# Patient Record
Sex: FEMALE | Race: WHITE | Employment: FULL TIME | URBAN - METROPOLITAN AREA
[De-identification: names, ages, dates, MRNs, and addresses within clinical notes are randomized per-mention and may not be internally consistent; named-entity substitution may affect disease eponyms.]

---

## 2017-04-06 ENCOUNTER — HOSPITAL ENCOUNTER (OUTPATIENT)
Dept: MAMMOGRAPHY | Age: 62
Discharge: HOME | End: 2017-04-06
Attending: NURSE PRACTITIONER
Payer: COMMERCIAL

## 2017-04-06 DIAGNOSIS — Z12.39 BREAST SCREENING: ICD-10-CM

## 2017-04-06 PROCEDURE — 77067 SCR MAMMO BI INCL CAD: CPT

## 2019-02-27 PROBLEM — Z76.0 MEDICINE REFILL: Status: ACTIVE | Noted: 2019-02-27

## 2019-03-11 PROBLEM — Z00.00 PREVENTATIVE HEALTH CARE: Status: ACTIVE | Noted: 2019-02-27

## 2019-04-26 ENCOUNTER — HOSPITAL ENCOUNTER (OUTPATIENT)
Dept: MAMMOGRAPHY | Age: 64
Discharge: HOME | End: 2019-04-26
Attending: FAMILY MEDICINE
Payer: COMMERCIAL

## 2019-04-26 DIAGNOSIS — Z12.31 VISIT FOR SCREENING MAMMOGRAM: ICD-10-CM

## 2019-04-26 PROCEDURE — 77067 SCR MAMMO BI INCL CAD: CPT

## 2019-09-23 PROBLEM — Z00.00 PREVENTATIVE HEALTH CARE: Status: RESOLVED | Noted: 2019-02-27 | Resolved: 2019-09-23

## 2021-04-23 ENCOUNTER — TRANSCRIBE ORDER (OUTPATIENT)
Dept: SCHEDULING | Age: 66
End: 2021-04-23

## 2021-04-23 DIAGNOSIS — Z12.31 SCREENING MAMMOGRAM FOR HIGH-RISK PATIENT: Primary | ICD-10-CM

## 2021-04-30 ENCOUNTER — HOSPITAL ENCOUNTER (OUTPATIENT)
Dept: MAMMOGRAPHY | Age: 66
Discharge: HOME | End: 2021-04-30
Attending: FAMILY MEDICINE
Payer: COMMERCIAL

## 2021-04-30 DIAGNOSIS — Z12.31 SCREENING MAMMOGRAM FOR HIGH-RISK PATIENT: ICD-10-CM

## 2021-04-30 PROCEDURE — 77063 BREAST TOMOSYNTHESIS BI: CPT

## 2021-11-01 ENCOUNTER — HOSPITAL ENCOUNTER (EMERGENCY)
Age: 66
Discharge: HOME | End: 2021-11-01
Attending: EMERGENCY MEDICINE
Payer: COMMERCIAL

## 2021-11-01 ENCOUNTER — APPOINTMENT (OUTPATIENT)
Dept: CT IMAGING | Age: 66
End: 2021-11-01
Attending: PHYSICIAN ASSISTANT
Payer: COMMERCIAL

## 2021-11-01 ENCOUNTER — APPOINTMENT (OUTPATIENT)
Dept: GENERAL RADIOLOGY | Age: 66
End: 2021-11-01
Attending: PHYSICIAN ASSISTANT
Payer: COMMERCIAL

## 2021-11-01 VITALS
HEART RATE: 80 BPM | RESPIRATION RATE: 17 BRPM | HEIGHT: 61 IN | OXYGEN SATURATION: 99 % | WEIGHT: 219 LBS | SYSTOLIC BLOOD PRESSURE: 168 MMHG | TEMPERATURE: 98.8 F | BODY MASS INDEX: 41.35 KG/M2 | DIASTOLIC BLOOD PRESSURE: 83 MMHG
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
## 2021-11-01 PROCEDURE — 70450 CT HEAD/BRAIN W/O DYE: CPT

## 2021-11-01 PROCEDURE — 73030 X-RAY EXAM OF SHOULDER: CPT

## 2021-11-01 PROCEDURE — 99283 EMERGENCY DEPT VISIT LOW MDM: CPT

## 2021-11-01 PROCEDURE — 72125 CT NECK SPINE W/O DYE: CPT

## 2021-11-01 NOTE — DISCHARGE INSTRUCTIONS
X-rays and CAT scan are negative for internal injury or fractures. Use Tylenol or Motrin for pain. Return for any other acute concerns.

## 2021-11-01 NOTE — ED NOTES
I have reviewed discharge instructions with the patient. The patient verbalized understanding. Patient left ED via Discharge Method: ambulatory to Home with self. Opportunity for questions and clarification provided. Patient given 0 scripts. To continue your aftercare when you leave the hospital, you may receive an automated call from our care team to check in on how you are doing. This is a free service and part of our promise to provide the best care and service to meet your aftercare needs.  If you have questions, or wish to unsubscribe from this service please call [phone]. Thank you for Choosing our [facility] Emergency Department.

## 2021-11-01 NOTE — ED PROVIDER NOTES
Patient is a 80-year-old female presents to the emergency department this morning after suffering an accidental fall at work at 5 AM.  She states she tripped on a landscape rock at the edge of the sidewalk. [place] forward onto a concrete sidewalk. She did strike her head. Complains of left shoulder pain, headache. There was no loss of consciousness. There was some left-sided neck pain but that is since resolved after taking some Tylenol. The history is provided by the patient. Fall  The accident occurred 3 to 5 hours ago. The fall occurred while walking. She fell from a height of ground level. She landed on concrete. There was no blood loss. The point of impact was the head. The pain is present in the head and left shoulder. The pain is mild. She was ambulatory at the scene. There was no entrapment after the fall. There was no drug use involved in the accident. There was no alcohol use involved in the accident. Associated symptoms include headaches. Pertinent negatives include no fever, no numbness, no abdominal pain, no nausea, no vomiting, no extremity weakness, no loss of consciousness, no tingling and no laceration. She has tried acetaminophen for the symptoms. The treatment provided mild relief. The patient's last tetanus shot was 5 to 10 years ago. Past Medical History:   Diagnosis Date    H/O seasonal allergies     Heel spur, left     High blood pressure     Neuropathy     Type 2 diabetes mellitus (HCC)        No past surgical history on file.       Family History:   Problem Relation Age of Onset    Hypertension Other         unknown family member    High Cholesterol Other         unknown family member    Diabetes Other         unknown family member       Social History     Socioeconomic History    Marital status:      Spouse name: Not on file    Number of children: Not on file    Years of education: Not on file    Highest education level: Not on file   Occupational History  Not on file   Tobacco Use    Smoking status: Current Every Day Smoker     Packs/day: 0.50     Types: Cigarettes    Smokeless tobacco: Never Used   Substance and Sexual Activity    Alcohol use: No    Drug use: Never    Sexual activity: Not Currently   Other Topics Concern    Not on file   Social History Narrative    Not on file     Social Determinants of Health     Financial Resource Strain:     Difficulty of Paying Living Expenses:    Food Insecurity:     Worried About Running Out of Food in the Last Year:     [address] in the Last Year:    Transportation Needs:     Lack of Transportation (Medical):  Lack of Transportation (Non-Medical):    Physical Activity:     Days of Exercise per Week:     Minutes of Exercise per Session:    Stress:     Feeling of Stress :    Social Connections:     Frequency of Communication with Friends and Family:     Frequency of Social Gatherings with Friends and Family:     Attends [employer] Services:     Active Member of Clubs or Organizations:     Attends Club or Organization Meetings:     Marital Status:    Intimate Partner Violence:     Fear of Current or Ex-Partner:     Emotionally Abused:     Physically Abused:     Sexually Abused: ALLERGIES: Clindamycin, Lisinopril, and Sulfa (sulfonamide antibiotics)    Review of Systems   Constitutional: Negative for chills, fatigue and fever. HENT: Negative for congestion, rhinorrhea and sore throat. Eyes: Negative for pain, discharge and visual disturbance. Respiratory: Negative for cough and shortness of breath. Cardiovascular: Negative for chest pain and palpitations. Gastrointestinal: Negative for abdominal pain, diarrhea, nausea and vomiting. Endocrine: Negative for polydipsia and polyuria. Genitourinary: Negative for dysuria, frequency and urgency. Musculoskeletal: Positive for neck pain. Negative for back pain and extremity weakness. Skin: Negative for rash.    Neurological: Positive for headaches. Negative for dizziness, tingling, seizures, loss of consciousness, syncope, facial asymmetry, weakness and numbness. Hematological: Negative. Vitals:    11/01/21 1027   BP: (!) 158/82   Pulse: 84   Resp: 18   Temp: 98.8 °F (37.1 °C)   SpO2: 98%   Weight: 99.3 kg (219 lb)            Physical Exam  Vitals and nursing note reviewed. Constitutional:       Appearance: She is well-developed. HENT:      Head: Normocephalic. Comments: Abrasion and contusion around the left eye. Nose: Nose normal.      Mouth/Throat:      Mouth: Mucous membranes are moist.      Pharynx: Oropharynx is clear. Comments: No malocclusion  Eyes:      Extraocular Movements: Extraocular movements intact. Conjunctiva/sclera: Conjunctivae normal.      Pupils: Pupils are equal, round, and reactive to light. Cardiovascular:      Rate and Rhythm: Normal rate and regular rhythm. Pulses: Normal pulses. Pulmonary:      Effort: Pulmonary effort is normal.      Breath sounds: Normal breath sounds. Abdominal:      Palpations: Abdomen is soft. Tenderness: There is no abdominal tenderness. There is no guarding or rebound. Musculoskeletal:         General: Tenderness present. No deformity or signs of injury. Normal range of motion. Cervical back: Normal range of motion and neck supple. No tenderness. Comments: Mild tenderness to the left shoulder but has full range of motion. Lymphadenopathy:      Cervical: No cervical adenopathy. Skin:     General: Skin is warm and dry. Capillary Refill: Capillary refill takes less than 2 seconds. Findings: No laceration or rash. Neurological:      General: No focal deficit present. Mental Status: She is alert and oriented to person, place, and time. Mental status is at baseline. GCS: GCS eye subscore is 4. GCS verbal subscore is 5. GCS motor subscore is 6. Cranial Nerves: No cranial nerve deficit.       Sensory: No sensory deficit. Motor: No weakness. Coordination: Coordination normal.          MDM  Number of Diagnoses or Management Options  Diagnosis management comments: I wore appropriate PPE throughout this patient's ED visit. Mallie Riedel, MD, 11:10 AM    XR SHOULDER LT AP/LAT MIN 2 V   Final Result    No acute osseous abnormality or joint derangement of the left    shoulder. CT HEAD WO CONT   Final Result        No evidence of acute intracranial trauma. Chronic cavernous angioma the right frontal lobe. Date of Dictation: 11/1/2021 12:00 PM         CT SPINE CERV WO CONT   Final Result        1 low system facet arthropathy. No fractures are identified. Date of Dictation: 11/1/2021 12:01 PM         Radiographs are negative. Will discharge to home advised Tylenol or Motrin for pain. Voice dictation software was used during the making of this note. This software is not perfect and grammatical and other typographical errors may be present. This note has been proofread, but may still contain errors.   Mallie Riedel, MD; 11/1/2021 @12:27 PM   ===================================================================               Amount and/or Complexity of Data Reviewed  Tests in the radiology section of CPT®: ordered and reviewed  Independent visualization of images, tracings, or specimens: yes    Risk of Complications, Morbidity, and/or Mortality  Presenting problems: moderate  Diagnostic procedures: moderate  Management options: low    Patient Progress  Patient progress: stable         Procedures

## 2021-11-01 NOTE — ED TRIAGE NOTES
Patient presents after a fall at work on sidewalk. Patient had mechanical fall. Patient complaints of hitting her head. Denies blood thinners. In addition patient with complaints left shoulder pain. Patient denies any changes in vision or any other neuro difficulty.

## 2021-11-01 NOTE — Clinical Note
[facility] EMERGENCY DEPT   [facility] [address] [id number] [phone]    Work/School Note    Date: 11/1/2021    To Whom It May concern:      Yasmin Goodson was seen and treated today in the emergency room by the following provider(s):  Attending Provider: Lavinia Swann MD.      Yasmin Goodson is excused from work/school on 11/01/21. She is clear to return to work/school on 11/02/21.         Sincerely,          Jose Raul Pepe MD

## 2022-08-29 ENCOUNTER — OFFICE VISIT (OUTPATIENT)
Dept: OCCUPATIONAL MEDICINE | Age: 67
End: 2022-08-29

## 2022-08-29 VITALS — SYSTOLIC BLOOD PRESSURE: 150 MMHG | DIASTOLIC BLOOD PRESSURE: 88 MMHG | RESPIRATION RATE: 16 BRPM | HEART RATE: 68 BPM

## 2022-08-29 DIAGNOSIS — I10 ESSENTIAL (PRIMARY) HYPERTENSION: ICD-10-CM

## 2022-08-29 DIAGNOSIS — E11.9 TYPE 2 DIABETES MELLITUS WITHOUT COMPLICATION, WITHOUT LONG-TERM CURRENT USE OF INSULIN (HCC): Primary | ICD-10-CM

## 2022-08-29 PROCEDURE — 99213 OFFICE O/P EST LOW 20 MIN: CPT | Performed by: NURSE PRACTITIONER

## 2022-08-29 PROCEDURE — 1123F ACP DISCUSS/DSCN MKR DOCD: CPT | Performed by: NURSE PRACTITIONER

## 2022-08-29 RX ORDER — HYDROCHLOROTHIAZIDE 25 MG/1
25 TABLET ORAL DAILY
Qty: 30 TABLET | Refills: 0 | Status: SHIPPED | OUTPATIENT
Start: 2022-08-29 | End: 2022-09-30 | Stop reason: SDUPTHER

## 2022-08-29 RX ORDER — PIOGLITAZONEHYDROCHLORIDE 15 MG/1
TABLET ORAL
COMMUNITY
Start: 2022-05-26 | End: 2022-08-29 | Stop reason: SDUPTHER

## 2022-08-29 RX ORDER — HYDROCHLOROTHIAZIDE 25 MG/1
25 TABLET ORAL DAILY
COMMUNITY
Start: 2022-02-09 | End: 2022-08-29 | Stop reason: SDUPTHER

## 2022-08-29 RX ORDER — HYDROCHLOROTHIAZIDE 25 MG/1
TABLET ORAL
COMMUNITY
Start: 2022-06-05 | End: 2022-08-29

## 2022-08-29 RX ORDER — PIOGLITAZONEHYDROCHLORIDE 15 MG/1
15 TABLET ORAL DAILY
Qty: 30 TABLET | Refills: 0 | Status: SHIPPED | OUTPATIENT
Start: 2022-08-29 | End: 2022-09-30 | Stop reason: SDUPTHER

## 2022-08-29 NOTE — PROGRESS NOTES
River Daniel is a 78 y.o. female Here today for medication refill on   Requested Prescriptions     Signed Prescriptions Disp Refills    hydroCHLOROthiazide (HYDRODIURIL) 25 MG tablet 30 tablet 0     Sig: Take 1 tablet by mouth daily    pioglitazone (ACTOS) 15 MG tablet 30 tablet 0     Sig: Take 1 tablet by mouth daily     Has been tolerating medication without problem. Uses as prescribed and tolerates well without side effects/adverse reactions. Denies SOB/CP/N/V/Fever/rash/visio changes/palpitations/ syncope/pain/arthralgia/myalgia/suicidal or homicidal ideations. Alert and oriented x3; No acute distress. Well groomed, steady gait. S1 S2 Regular rate and rhythm, no JVD, no carotid bruit, no murmur/gallops/rubs  Lungs clear to auscultation bilaterally  Skin warm dry intact, mucous membranes moist    A:    Diagnosis Orders   1. Type 2 diabetes mellitus without complication, without long-term current use of insulin (HCC)  pioglitazone (ACTOS) 15 MG tablet      2. Essential (primary) hypertension  hydroCHLOROthiazide (HYDRODIURIL) 25 MG tablet      BP (!) 150/88 (Site: Left Upper Arm)   Pulse 68   Resp 16       Follow up: Patient was encouraged to return to the clinic and/or PCP if s/s persist or do not resolve completely. Also advised to seek emergent care if worsening signs and symptoms warrant immediate evaluation including, but not limited to HA, blurred vision, speech disturbance, difficulty with ambulation/gait, numbness, tingling, weakness, syncope, abdominal pain, chest pain, or shortness of breath. *Side effects, adverse effects, risks versus benefits associated with medications prescribed/recommended were discussed with the patient. Patient verbalized understanding. All questions answered. Patient to follow up with PCP as scheduled.       * I have reviewed the patient's medication list, past medical, family, social, and surgical    history and updated the patient record appropriately      Social History     Socioeconomic History    Marital status:      Spouse name: Not on file    Number of children: Not on file    Years of education: Not on file    Highest education level: Not on file   Occupational History    Not on file   Tobacco Use    Smoking status: Every Day     Packs/day: 0.50     Types: Cigarettes    Smokeless tobacco: Never   Substance and Sexual Activity    Alcohol use: No    Drug use: Never    Sexual activity: Not on file   Other Topics Concern    Not on file   Social History Narrative    Not on file     Social Determinants of Health     Financial Resource Strain: Not on file   Food Insecurity: Not on file   Transportation Needs: Not on file   Physical Activity: Not on file   Stress: Not on file   Social Connections: Not on file   Intimate Partner Violence: Not on file   Housing Stability: Not on file     Past Medical History:   Diagnosis Date    H/O seasonal allergies     Heel spur, left     High blood pressure     Neuropathy     Type 2 diabetes mellitus ([facility] [address]      No past surgical history on file. Family History   Problem Relation Age of Onset    Diabetes Other         unknown family member    Hypertension Other         unknown family member    High Cholesterol Other         unknown family member     Counseled on benefits of having a primary care provider which includes, but is not limited to, continuity of care and having a medical home when concerns arise. Also enforced that onsite clinic policy states that we are not to take the place of a primary care provider, pt verbalized understanding. SEs and risk vs benefits associated with medications prescribed discussed with patient who verbalized understanding. Pt verbalized understanding and agreement with plan of care. RTC for persisting/worsening symptoms or new complaints that arise.  Discussed signs and symptoms that would warrant immediate evaluation including, but not limited to HA, blurred vision, speech disturbance, difficulty with ambulation/gait, numbness, tingling, weakness, syncope, chest pain, or shortness of breath. I have reviewed the patient's medication list, past medical, family, social, and surgical history in detail and updated the patient record appropriately. Ld Ryan NP    Follow Up with PCP asap for lab work and next medication refill.

## 2022-09-30 ENCOUNTER — OFFICE VISIT (OUTPATIENT)
Dept: OCCUPATIONAL MEDICINE | Age: 67
End: 2022-09-30

## 2022-09-30 VITALS — DIASTOLIC BLOOD PRESSURE: 88 MMHG | OXYGEN SATURATION: 95 % | HEART RATE: 83 BPM | SYSTOLIC BLOOD PRESSURE: 160 MMHG

## 2022-09-30 DIAGNOSIS — I10 ESSENTIAL (PRIMARY) HYPERTENSION: Primary | ICD-10-CM

## 2022-09-30 DIAGNOSIS — E11.9 TYPE 2 DIABETES MELLITUS WITHOUT COMPLICATION, WITHOUT LONG-TERM CURRENT USE OF INSULIN (HCC): ICD-10-CM

## 2022-09-30 RX ORDER — PIOGLITAZONEHYDROCHLORIDE 15 MG/1
15 TABLET ORAL DAILY
Qty: 30 TABLET | Refills: 2 | Status: SHIPPED | OUTPATIENT
Start: 2022-09-30

## 2022-09-30 RX ORDER — PIOGLITAZONEHYDROCHLORIDE 15 MG/1
15 TABLET ORAL DAILY
COMMUNITY
Start: 2022-01-28 | End: 2022-09-30

## 2022-09-30 RX ORDER — HYDROCHLOROTHIAZIDE 25 MG/1
25 TABLET ORAL DAILY
Qty: 30 TABLET | Refills: 2 | Status: SHIPPED | OUTPATIENT
Start: 2022-09-30

## 2022-09-30 RX ORDER — APREMILAST 30 MG/1
30 TABLET, FILM COATED ORAL DAILY
COMMUNITY

## 2022-09-30 RX ORDER — FEXOFENADINE HCL 180 MG/1
1 TABLET ORAL DAILY
COMMUNITY

## 2022-09-30 RX ORDER — ERGOCALCIFEROL (VITAMIN D2) 1250 MCG
50000 CAPSULE ORAL
COMMUNITY

## 2022-09-30 RX ORDER — ASPIRIN 81 MG/1
81 TABLET ORAL DAILY
COMMUNITY

## 2022-09-30 RX ORDER — GABAPENTIN 300 MG/1
600 CAPSULE ORAL 3 TIMES DAILY
COMMUNITY
Start: 2022-03-01

## 2022-09-30 NOTE — PROGRESS NOTES
Patient here for refill of her BP medication and diabetes medication. She is tolerating both of these medications well without any side effects or concerns. Previous labs reviewed. She is going to schedule an appointment with her PCP (Dr. Reba Garcia at [facility]) asap for routine lab work.

## 2023-02-03 ENCOUNTER — OFFICE VISIT (OUTPATIENT)
Dept: OCCUPATIONAL MEDICINE | Age: 68
End: 2023-02-03

## 2023-02-03 DIAGNOSIS — H00.011 HORDEOLUM EXTERNUM OF RIGHT UPPER EYELID: Primary | ICD-10-CM

## 2023-02-03 ASSESSMENT — ENCOUNTER SYMPTOMS
EYE REDNESS: 1
DOUBLE VISION: 0
GASTROINTESTINAL NEGATIVE: 1
VOMITING: 0
ALLERGIC/IMMUNOLOGIC NEGATIVE: 1
BLURRED VISION: 0
RESPIRATORY NEGATIVE: 1
PHOTOPHOBIA: 0
EYE PAIN: 1
EYE DISCHARGE: 0
EYE ITCHING: 0
NAUSEA: 0
FOREIGN BODY SENSATION: 0

## 2023-02-03 ASSESSMENT — VISUAL ACUITY: OU: 1

## 2023-02-03 NOTE — PROGRESS NOTES
PROGRESS NOTE    SUBJECTIVE:     Ailyn Marti is a 78 y.o. female seen for:    Chief Complaint    Stye       Eye Problem   The right eye is affected. This is a new problem. The current episode started yesterday. The problem has been unchanged. The injury mechanism is unknown. The pain is mild. There is No known exposure to pink eye. She Does not wear contacts. Associated symptoms include eye redness. Pertinent negatives include no blurred vision, eye discharge, double vision, fever, foreign body sensation, itching, nausea, photophobia, recent URI or vomiting. She has tried nothing for the symptoms. Current Outpatient Medications   Medication Sig Dispense Refill    fexofenadine (ALLEGRA) 180 MG tablet Take 1 tablet by mouth daily      aspirin 81 MG EC tablet Take 81 mg by mouth daily      ergocalciferol (ERGOCALCIFEROL) 1.25 MG (00427 UT) capsule Take 50,000 Units by mouth      gabapentin (NEURONTIN) 300 MG capsule Take 600 mg by mouth 3 times daily. hydroCHLOROthiazide (HYDRODIURIL) 25 MG tablet Take 1 tablet by mouth daily 30 tablet 2    pioglitazone (ACTOS) 15 MG tablet Take 1 tablet by mouth daily 30 tablet 2     No current facility-administered medications for this visit. Allergies   Allergen Reactions    Lisinopril Cough and Other (See Comments)    Clindamycin Itching and Rash    Sulfa Antibiotics Itching and Other (See Comments)     Other reaction(s): Itching-Allergy  Muscle aches  Muscle aches       Social History     Tobacco Use    Smoking status: Every Day     Packs/day: 0.50     Types: Cigarettes    Smokeless tobacco: Never   Substance Use Topics    Alcohol use: No    Drug use: Never      Review of Systems   Constitutional:  Negative for fever. HENT: Negative. Eyes:  Positive for pain and redness. Negative for blurred vision, double vision, photophobia, discharge and itching. Respiratory: Negative. Cardiovascular: Negative. Gastrointestinal: Negative.   Negative for nausea and vomiting. Endocrine: Negative. Genitourinary: Negative. Musculoskeletal: Negative. Skin: Negative. Allergic/Immunologic: Negative. Neurological: Negative. Hematological: Negative. Psychiatric/Behavioral: Negative. OBJECTIVE:    Physical Exam  Constitutional:       General: She is not in acute distress. Appearance: Normal appearance. HENT:      Head: Normocephalic and atraumatic. Nose: Nose normal.   Eyes:      General: Lids are everted, no foreign bodies appreciated. Vision grossly intact. Gaze aligned appropriately. Right eye: Hordeolum present. No foreign body or discharge. Left eye: No foreign body, discharge or hordeolum. Extraocular Movements: Extraocular movements intact. Conjunctiva/sclera:      Right eye: Right conjunctiva is injected. No chemosis, exudate or hemorrhage. Left eye: Left conjunctiva is not injected. No chemosis, exudate or hemorrhage. Pulmonary:      Effort: Pulmonary effort is normal.   Neurological:      Mental Status: She is alert. ASSESSMENT and Mayito England was seen today for stye. Diagnoses and all orders for this visit:    Hordeolum externum of right upper eyelid    Warm compresses TID x 15 minutes each application until symptoms resolve. Explained to patient styes usually resolve on their own after about 1 week. See patient's personal eye doctor if symptoms do not improve after 1 week and/or if symptoms worsen. Provided patient with printed Epic handout on Stye care. RTC with any questions or concerns. Follow up with PCP for routine care. Counseled on benefits of having a primary care provider which includes, but is not limited to, continuity of care and having a medical home when concerns arise. Also enforced that onsite clinic policy states that we are not to take the place of a primary care provider, pt verbalized understanding.      SEs and risk vs benefits associated with medications prescribed discussed with patient who verbalized understanding. Pt verbalized understanding and agreement with plan of care. RTC for persisting/worsening symptoms or new complaints that arise. Discussed signs and symptoms that would warrant immediate evaluation including, but not limited to HA, blurred vision, speech disturbance, difficulty with ambulation/gait, numbness, tingling, weakness, syncope, chest pain, or shortness of breath. I have reviewed the patient's medication list, past medical, family, social, and surgical history in detail and updated the patient record appropriately.     Millie Fothergill, APRN - CNP

## 2023-02-10 ENCOUNTER — OFFICE VISIT (OUTPATIENT)
Dept: OCCUPATIONAL MEDICINE | Age: 68
End: 2023-02-10

## 2023-02-10 DIAGNOSIS — E11.9 TYPE 2 DIABETES MELLITUS WITHOUT COMPLICATION, WITHOUT LONG-TERM CURRENT USE OF INSULIN (HCC): ICD-10-CM

## 2023-02-10 DIAGNOSIS — I10 ESSENTIAL (PRIMARY) HYPERTENSION: ICD-10-CM

## 2023-02-10 DIAGNOSIS — K42.9 UMBILICAL HERNIA WITHOUT OBSTRUCTION AND WITHOUT GANGRENE: ICD-10-CM

## 2023-02-10 DIAGNOSIS — Z76.0 MEDICATION REFILL: Primary | ICD-10-CM

## 2023-02-10 RX ORDER — HYDROCHLOROTHIAZIDE 25 MG/1
25 TABLET ORAL DAILY
Qty: 90 TABLET | Refills: 0 | Status: SHIPPED | OUTPATIENT
Start: 2023-02-10

## 2023-02-10 RX ORDER — PIOGLITAZONEHYDROCHLORIDE 15 MG/1
15 TABLET ORAL DAILY
Qty: 90 TABLET | Refills: 0 | Status: SHIPPED | OUTPATIENT
Start: 2023-02-10

## 2023-02-10 ASSESSMENT — ENCOUNTER SYMPTOMS
ANAL BLEEDING: 0
RESPIRATORY NEGATIVE: 1
ABDOMINAL DISTENTION: 1
BLOOD IN STOOL: 0
EYES NEGATIVE: 1
NAUSEA: 0
ALLERGIC/IMMUNOLOGIC NEGATIVE: 1
RECTAL PAIN: 0
VOMITING: 0
CONSTIPATION: 0
ABDOMINAL PAIN: 1
DIARRHEA: 0

## 2023-02-10 NOTE — PROGRESS NOTES
PROGRESS NOTE    SUBJECTIVE:     Brett Torres is a 78 y.o. female seen for:    Chief Complaint    Medication Refill       Patient requests refills of Actos & HCTZ. She had to cancel her most recent appointment with Dr. Reyes Leo ([address]) due to complications with her mother (has Alzheimer's). She is planning to call Dr. Reyes Leo today to schedule an appointment for routine physical. She uses both of these medications as prescribed and tolerates well without side effects/adverse reactions. 2.   Patient complains of new umbilical hernia x 3-4 months. Noticed it appeared when she was taking the [facility] for psoriasis. Patient stopped this medication because she read the side effects and was concerned. Also, patient has a right inguinal hernia that has been there for several years now. The umbilical hernia is enlarging and hurts. She would like a referral to a general surgeon. Current Outpatient Medications   Medication Sig Dispense Refill    hydroCHLOROthiazide (HYDRODIURIL) 25 MG tablet Take 1 tablet by mouth daily 90 tablet 0    pioglitazone (ACTOS) 15 MG tablet Take 1 tablet by mouth daily 90 tablet 0    fexofenadine (ALLEGRA) 180 MG tablet Take 1 tablet by mouth daily      aspirin 81 MG EC tablet Take 81 mg by mouth daily      ergocalciferol (ERGOCALCIFEROL) 1.25 MG (14668 UT) capsule Take 50,000 Units by mouth      gabapentin (NEURONTIN) 300 MG capsule Take 600 mg by mouth 3 times daily. No current facility-administered medications for this visit.       Allergies   Allergen Reactions    Lisinopril Cough and Other (See Comments)    Clindamycin Itching and Rash    Sulfa Antibiotics Itching and Other (See Comments)     Other reaction(s): Itching-Allergy  Muscle aches  Muscle aches       Social History     Tobacco Use    Smoking status: Every Day     Packs/day: 0.50     Types: Cigarettes    Smokeless tobacco: Never   Substance Use Topics    Alcohol use: No    Drug use: Never Review of Systems   Constitutional: Negative. HENT: Negative. Eyes: Negative. Respiratory: Negative. Cardiovascular: Negative. Gastrointestinal:  Positive for abdominal distention and abdominal pain. Negative for anal bleeding, blood in stool, constipation, diarrhea, nausea, rectal pain and vomiting. Painful enlarging umbilical hernia   Endocrine: Negative. Genitourinary: Negative. Musculoskeletal: Negative. Skin: Negative. Allergic/Immunologic: Negative. Neurological: Negative. Hematological: Negative. Psychiatric/Behavioral: Negative. OBJECTIVE:    Physical Exam  Constitutional:       Appearance: Normal appearance. She is obese. Pulmonary:      Effort: Pulmonary effort is normal.   Abdominal:      Hernia: A hernia is present. Comments: Umbilical   Skin:     General: Skin is warm and dry. Neurological:      Mental Status: She is alert and oriented to person, place, and time. Psychiatric:         Mood and Affect: Mood normal.         Behavior: Behavior normal.     ASSESSMENT and Devota Boeck was seen today for medication refill. Diagnoses and all orders for this visit:    Medication refill    Essential (primary) hypertension  -     hydroCHLOROthiazide (HYDRODIURIL) 25 MG tablet; Take 1 tablet by mouth daily    Type 2 diabetes mellitus without complication, without long-term current use of insulin (HCC)  -     pioglitazone (ACTOS) 15 MG tablet; Take 1 tablet by mouth daily    Umbilical hernia without obstruction and without gangrene  -     [facility] - Brittni Colbert MD, General Surgery, [facility]    Continue medication as prescribed. Discussed importance of following up with Dr. Marycruz Payan (PCP) for routine physical and future medication refills. To ER with severe/worsening hernia. RTC as needed with any questions or concerns.      Counseled on benefits of having a primary care provider which includes, but is not limited to, continuity of care and having a medical home when concerns arise. Also enforced that onsite clinic policy states that we are not to take the place of a primary care provider, pt verbalized understanding. SEs and risk vs benefits associated with medications prescribed discussed with patient who verbalized understanding. Pt verbalized understanding and agreement with plan of care. RTC for persisting/worsening symptoms or new complaints that arise. Discussed signs and symptoms that would warrant immediate evaluation including, but not limited to HA, blurred vision, speech disturbance, difficulty with ambulation/gait, numbness, tingling, weakness, syncope, chest pain, or shortness of breath. I have reviewed the patient's medication list, past medical, family, social, and surgical history in detail and updated the patient record appropriately.     Dimas Michael, MEENA - CNP

## 2023-02-17 ENCOUNTER — OFFICE VISIT (OUTPATIENT)
Dept: SURGERY | Age: 68
End: 2023-02-17

## 2023-02-17 VITALS
OXYGEN SATURATION: 98 % | WEIGHT: 252 LBS | HEART RATE: 81 BPM | DIASTOLIC BLOOD PRESSURE: 92 MMHG | BODY MASS INDEX: 47.58 KG/M2 | HEIGHT: 61 IN | SYSTOLIC BLOOD PRESSURE: 160 MMHG

## 2023-02-17 DIAGNOSIS — F17.210 CIGARETTE SMOKER: ICD-10-CM

## 2023-02-17 DIAGNOSIS — E66.01 CLASS 3 SEVERE OBESITY WITH SERIOUS COMORBIDITY AND BODY MASS INDEX (BMI) OF 45.0 TO 49.9 IN ADULT, UNSPECIFIED OBESITY TYPE (HCC): ICD-10-CM

## 2023-02-17 DIAGNOSIS — L40.9 PSORIASIS: ICD-10-CM

## 2023-02-17 DIAGNOSIS — K43.0 INCISIONAL HERNIA OF ANTERIOR ABDOMINAL WALL WITH OBSTRUCTION: Primary | ICD-10-CM

## 2023-02-17 DIAGNOSIS — M62.08 DIASTASIS OF RECTUS ABDOMINIS: ICD-10-CM

## 2023-02-17 DIAGNOSIS — E11.69 TYPE 2 DIABETES MELLITUS WITH OTHER SPECIFIED COMPLICATION, UNSPECIFIED WHETHER LONG TERM INSULIN USE (HCC): ICD-10-CM

## 2023-02-17 DIAGNOSIS — E65 ABDOMINAL PANNUS: ICD-10-CM

## 2023-02-17 PROBLEM — E66.813 CLASS 3 SEVERE OBESITY WITH SERIOUS COMORBIDITY AND BODY MASS INDEX (BMI) OF 45.0 TO 49.9 IN ADULT (HCC): Status: ACTIVE | Noted: 2023-02-17

## 2023-02-17 PROCEDURE — 3080F DIAST BP >= 90 MM HG: CPT | Performed by: SURGERY

## 2023-02-17 PROCEDURE — 99204 OFFICE O/P NEW MOD 45 MIN: CPT | Performed by: SURGERY

## 2023-02-17 PROCEDURE — 3077F SYST BP >= 140 MM HG: CPT | Performed by: SURGERY

## 2023-02-17 PROCEDURE — 1123F ACP DISCUSS/DSCN MKR DOCD: CPT | Performed by: SURGERY

## 2023-02-17 NOTE — PROGRESS NOTES
[address] [place] [address]  [phone]    Office Note/Consult Note   Earnest Vazquez   MRN: [id number]     : 1955        HPI: Earnest Vazquez is a 78 y.o. female who is seen in the office on 2023, referred by Beatriz Krues for evaluation of new abdominal wall hernia and possible right inguinal hernia. Patient states that she has noted her bellybutton going from and \"innie\" to an \"outie\" [facility]. She states that the area becomes sore making it difficult to have her elastic pant waist nearby. She denies any bowel obstructive type symptoms. She denies changes in the bulging. She denies prior abdominal surgery or hernia repairs, but on further questioning she admits to history of laparoscopic cholecystectomy approximately 15 years ago with use of a supraumbilical trocar site. Referral also makes mention of a right inguinal hernia that she has not noticed but states it was called to her attention by her chiropractor. She has had no imaging. She reports also a recent boil near her umbilicus 2 or 3 days ago that has spontaneously drained and is improving. Her weight today is 252 pounds with a BMI of 47.61 kg/m². She states that approximately 2 years ago she lost 50 pounds but has gained 20 of those pounds back. She smokes 1/2 pack of cigarettes per day for many years. She is on gabapentin for chronic pain. She has psoriasis but is no longer taking any medication for it. She does not report that her abdominal skin is heavily affected by her psoriasis but primarily behind her legs. Past Medical History:   Diagnosis Date    H/O seasonal allergies     Heel spur, left     High blood pressure     Neuropathy     [facility]      History reviewed. No pertinent surgical history.     Current Outpatient Medications   Medication Sig    hydroCHLOROthiazide (HYDRODIURIL) 25 MG tablet Take 1 tablet by mouth daily    pioglitazone (ACTOS) 15 MG tablet Take 1 tablet by mouth daily    fexofenadine (ALLEGRA) 180 MG tablet Take 1 tablet by mouth daily    aspirin 81 MG EC tablet Take 81 mg by mouth daily    ergocalciferol (ERGOCALCIFEROL) 1.25 MG (25926 UT) capsule Take 50,000 Units by mouth    gabapentin (NEURONTIN) 300 MG capsule Take 600 mg by mouth 3 times daily. No current facility-administered medications for this visit. ALLERGIES:  Lisinopril, Clindamycin, and Sulfa antibiotics    Social History     Socioeconomic History    Marital status:      Spouse name: None    Number of children: None    Years of education: None    Highest education level: None   Tobacco Use    Smoking status: Every Day     Packs/day: 0.50     Types: Cigarettes    Smokeless tobacco: Never   Substance and Sexual Activity    Alcohol use: No    Drug use: Never     Social History     Tobacco Use   Smoking Status Every Day    Packs/day: 0.50    Types: Cigarettes   Smokeless Tobacco Never     Family History   Problem Relation Age of Onset    Diabetes Other         unknown family member    Hypertension Other         unknown family member    High Cholesterol Other         unknown family member       ROS: The patient has no difficulty with chest pain or shortness of breath. No fever or chills. The patient denies any personal or family history of abnormal clotting or bleeding. Comprehensive review of systems was otherwise unremarkable except as noted above. Physical Exam:   Constitutional: Alert oriented cooperative patient in no acute distress. Many animal hairs on clothing. BP (!) 160/92   Pulse 81   Ht 5' 1\" (1.549 m)   Wt 252 lb (114.3 kg)   SpO2 98%   BMI 47.61 kg/m²   Eyes:Sclera are clear without icterus. ENMT: no obvious neck masses, no ear or lip lesions  CV: RRR. Normal perfusion  Resp: No JVD. Breathing is  non-labored.     GI: severely obese, with massive pannus to mid thigh, soft and non-distended, well healed curved betty supraumbilical trocar scar, umbilical ring occupied by incarcerated tissue which does not move with valsalva, very large (15-20 cm) rectus diastasis that is visible with straining supine and on observation standing. Healing skin abscess drainage site to R of umbilicus. Musculoskeletal: unremarkable with normal function. Difficult ambulation and mobility from obesity   Neuro:  No obvious focal deficits  Psychiatric: normal affect and mood, no memory impairment    Lab Results   Component Value Date/Time    WBC 3.5 09/25/2020 07:12 AM    HGB 17.1 09/25/2020 07:12 AM    HCT 48.7 09/25/2020 07:12 AM     09/25/2020 07:12 AM    MCV 87 09/25/2020 07:12 AM       Lab Results   Component Value Date     (L) 09/25/2020    K 3.6 09/25/2020    CL 88 (L) 09/25/2020    CO2 20 09/25/2020    BUN 10 09/25/2020    CREATININE 0.70 09/25/2020    GLUCOSE 96 09/25/2020    CALCIUM 9.6 09/25/2020    PROT 7.3 09/25/2020    LABALBU 4.5 09/25/2020    BILITOT 1.1 09/25/2020    ALKPHOS 82 09/25/2020    AST 69 (H) 09/25/2020    ALT 31 09/25/2020    GFRAA 106 09/25/2020    AGRATIO 1.6 09/25/2020         No results found for: AMYLASE  No results found for: LIPASE       Assessment/Plan:     Roque Goldberg is a 78 y.o. female who presents to the office on 2/17/2023 with complaints of periumbilical hernia which appears to be a chronically incarcerated trocar incisional hernia from her laparoscopic cholecystectomy 15 years ago. She has had no bowel obstructive symptoms or incarceration events. The hernia does not change with Valsalva. It is surrounded by an extremely wide rectus diastases which produces most of the bulging in this area. I cannot identify a right inguinal hernia on exam, but exam is severely limited by a large pannus that extends to her thighs. Her current weight is 252 pounds with a BMI of 47.61 kg/m². She is a chronic cigarette smoker. She has psoriasis. She is diabetic.   She has had some weight fluctuation in the last 2 years. Her BMI had been over 53 kg/m². She is a poor risk for elective abdominal wall hernia repair. We discussed the goals of hernia repairs and the risk versus benefit of surgery versus observation. She has had no bowel symptoms. He has had no imaging. We discussed obtaining a CT scan to evaluate her abdominal wall. It will also give us information about her inguinal regions. I believe that her hernia which is an incisional hernia from her trocar site contains incarcerated fat. It is stable and should not be reduced. It is within a very wide rectus diastases. Using the [facility] risk calculator for abdominal wall hernia repairs using modest data, she carries an excess complication risk of 77% with elective repair. She also has a very high risk of recurrence/failure of the repair, the need for a large mesh prosthetic due to her massively wide rectus diastases, and ultimately a potential risk to her bowel of greater than 20% related to mesh implantation. If CT does not show evidence of current risk to her bowel with the hernia or other features of concern, then repair would carry an exorbitantly higher risk for her than observation. She is in agreement with this plan. I will order a CT scan and see her back in 3 weeks to discuss those findings. A copy of this note is sent to the referring physician.     Patient Active Problem List    Diagnosis Date Noted    Incisional hernia of anterior abdominal wall with obstruction 02/17/2023     Priority: Medium    Diastasis of rectus abdominis 02/17/2023     Priority: Medium    Abdominal pannus 02/17/2023     Priority: Medium    Class 3 severe obesity with serious comorbidity and body mass index (BMI) of 45.0 to 49.9 in adult [facility]) 02/17/2023     Priority: Medium    Cigarette smoker 02/17/2023     Priority: Medium    Psoriasis 02/17/2023     Priority: Medium    High blood pressure     Type [facility] diabetes mellitus ([facility]         Level of MDM (Based on 2/3 elements below)  Number and Complexity of Problems Addressed Amount and/or Complexity of Data to be Reviewed and Analyzed  *Each unique test, order, or document contributes to the combination of 2 or combination of 3 in Category 1 below. Risk of Complications and/or Morbidity or Mortality of pt Management     16846  98330 SF Minimal  ?1self-limited or minor problem Minimal or none Minimal risk of morbidity from additional diagnostic testing or Rx   07334  60070 Low Low  ? 2or more self-limited or minor problems;    or  ? 1stable chronic illness;    or  ?1acute, uncomplicated illness or injury   Limited  (Must meet the requirements of at least 1 of the 2 categories)  Category 1: Tests and documents   ? Any combination of 2 from the following:  ?Review of prior external note(s) from each unique source*;  ?review of the result(s) of each unique test*;   ?ordering of each unique test*    or   Category 2: Assessment requiring an independent historian(s)  (For the categories of independent interpretation of tests and discussion of management or test interpretation, see moderate or high) Low risk of morbidity from additional diagnostic testing or treatment     33218  67427 Mod Moderate  ? 1or more chronic illnesses with exacerbation, progression, or side effects of treatment;    or  ?2or more stable chronic illnesses;    or      1undiagnosed new problem with uncertain prognosis;    or  ?1acute illness with systemic symptoms;    or  ?1acute complicated injury   Moderate:  (Must meet the requirements of 1 out of 3 categories)    Category 1: Tests, documents, or independent historian(s)  ? Any combination of 3 from the following:   ?Review of prior external note(s) from each unique source*;  ?Review of the result(s) of each unique test*;  ?Ordering of each unique test*;  ?Assessment requiring an independent historian(s)    or  Category 2: Independent interpretation of tests ?Independent interpretation of a test performed by another physician/other qualified health care professional (not separately reported);     or  Category 3: Discussion of management or test interpretation  ? Discussion of management or test interpretation with external physician/other qualified health care professional/appropriate source (not separately reported)   Moderate risk of morbidity from additional diagnostic testing or treatment  Examples only: ? ? Prescription drug management - including advanced wound care prescription wound care products  (eg Iodosorb, hydrofera, silvadene, collagen, puracol plus, optiloc, biologics - placenta, Theraskin, Apligraf). Note also that if home health care is involved, they will not apply topical therapies without a prescription/order from physician    ? Decision regarding minor surgery with identified patient or procedure risk factors  ? Decision regarding elective major surgery without identified patient or procedure risk factors   ? Diagnosis or treatment significantly limited by social determinants of health       24871  69812 High High  ? 1or more chronic illnesses with severe exacerbation, progression, or side effects of treatment;    or  ?1 acute or chronic illness or injury that poses a threat to life or bodily function   Extensive  (Must meet the requirements of at least 2 out of 3 categories)    Category 1: Tests, documents, or independent historian(s)  ? Any combination of 3 from the following:   ?Review of prior external note(s) from each unique source*;  ?Review of the result(s) of each unique test*;   ?Ordering of each unique test*;   ?Assessment requiring an independent historian(s)    or   Category 2: Independent interpretation of tests   ? Independent interpretation of a test performed by another physician/other qualified health care professional (not separately reported);     or  Category 3: Discussion of management or test interpretation  ? Discussion of management or test interpretation with external physician/other qualified health care professional/appropriate source (not separately reported)   High risk of morbidity from additional diagnostic testing or treatment  Examples only:  ?Drug therapy requiring intensive monitoring for toxicity  ? Decision regarding elective major surgery with identified patient or procedure risk factors  ? Decision regarding emergency major surgery  ? Decision regarding hospitalization  ? Decision not to resuscitate or to de-escalate care because of poor prognosis      Parenteral controlled substances             Dez Dykes MD,  FACS

## 2023-03-27 PROBLEM — L20.82 FLEXURAL ECZEMA: Status: ACTIVE | Noted: 2018-08-24

## 2023-03-27 PROBLEM — E55.9 VITAMIN D DEFICIENCY: Status: ACTIVE | Noted: 2018-08-24

## 2023-08-30 ENCOUNTER — OFFICE VISIT (OUTPATIENT)
Dept: OCCUPATIONAL MEDICINE | Age: 68
End: 2023-08-30

## 2023-08-30 VITALS — HEART RATE: 61 BPM | TEMPERATURE: 98.3 F | OXYGEN SATURATION: 93 %

## 2023-08-30 DIAGNOSIS — J06.9 VIRAL UPPER RESPIRATORY TRACT INFECTION: Primary | ICD-10-CM

## 2023-08-30 DIAGNOSIS — R68.89 FLU-LIKE SYMPTOMS: ICD-10-CM

## 2023-08-30 DIAGNOSIS — R06.02 SHORTNESS OF BREATH: ICD-10-CM

## 2023-08-30 LAB
EXP DATE SOLUTION: NORMAL
EXP DATE SWAB: NORMAL
EXPIRATION DATE: NORMAL
LOT NUMBER POC: NORMAL
LOT NUMBER SOLUTION: NORMAL
LOT NUMBER SWAB: NORMAL
QUICKVUE INFLUENZA TEST: NEGATIVE
SARS-COV-2 RNA, POC: NEGATIVE
VALID INTERNAL CONTROL, POC: YES

## 2023-08-30 ASSESSMENT — ENCOUNTER SYMPTOMS
ABDOMINAL PAIN: 0
SINUS PRESSURE: 1
VOMITING: 0
SINUS PAIN: 1
SHORTNESS OF BREATH: 1
NAUSEA: 1
CHEST TIGHTNESS: 0
RHINORRHEA: 1
SORE THROAT: 0
DIARRHEA: 0
EYES NEGATIVE: 1
WHEEZING: 0
SWOLLEN GLANDS: 0
COUGH: 1

## 2023-08-30 NOTE — PROGRESS NOTES
PROGRESS NOTE    SUBJECTIVE:     Kyra Hsu is a 80 y.o. female seen for:    Chief Complaint    URI       URI   This is a new problem. The current episode started yesterday. The problem has been rapidly worsening. There has been no fever. Associated symptoms include congestion, coughing, headaches, joint pain, nausea, a plugged ear sensation, rhinorrhea, sinus pain and sneezing. Pertinent negatives include no abdominal pain, chest pain, diarrhea, dysuria, ear pain, joint swelling, neck pain, rash, sore throat, swollen glands, vomiting or wheezing. She has tried NSAIDs for the symptoms. The treatment provided mild relief. Patient has smoked cigarettes since age 14. She smokes 1/2 pack per day now. Reports she was exposed to a sick coworker with COVID the other day. Patient has inhalers on medication list but is unsure what they were prescribed for and does not currently use them. Dr. Kelsie Can is her PCP ([facility]). She last saw him in March. Current Outpatient Medications   Medication Sig Dispense Refill    hydroCHLOROthiazide (HYDRODIURIL) 25 MG tablet Take 1 tablet by mouth daily 90 tablet 0    pioglitazone (ACTOS) 15 MG tablet Take 1 tablet by mouth daily 90 tablet 0    fexofenadine (ALLEGRA) 180 MG tablet Take 1 tablet by mouth daily      aspirin 81 MG EC tablet Take 1 tablet by mouth daily      gabapentin (NEURONTIN) 300 MG capsule Take 2 capsules by mouth 3 times daily. budesonide-formoterol (SYMBICORT) 160-4.5 MCG/ACT AERO Inhale 2 puffs into the lungs 2 times daily (Patient not taking: Reported on 8/30/2023) 10.2 g 0    albuterol sulfate HFA (PROVENTIL HFA) 108 (90 Base) MCG/ACT inhaler Inhale 2 puffs into the lungs every 6 hours as needed for Wheezing (Patient not taking: Reported on 8/30/2023) 18 g 0     No current facility-administered medications for this visit.       Allergies   Allergen Reactions    Lisinopril Cough and Other (See Comments)    Clindamycin Itching and Rash    Sulfa

## 2023-11-01 ENCOUNTER — OFFICE VISIT (OUTPATIENT)
Dept: OCCUPATIONAL MEDICINE | Age: 68
End: 2023-11-01

## 2023-11-01 VITALS — OXYGEN SATURATION: 98 % | HEART RATE: 66 BPM | TEMPERATURE: 98.2 F

## 2023-11-01 DIAGNOSIS — H00.011 HORDEOLUM EXTERNUM OF RIGHT UPPER EYELID: ICD-10-CM

## 2023-11-01 DIAGNOSIS — J01.00 ACUTE NON-RECURRENT MAXILLARY SINUSITIS: Primary | ICD-10-CM

## 2023-11-01 RX ORDER — AMOXICILLIN AND CLAVULANATE POTASSIUM 875; 125 MG/1; MG/1
1 TABLET, FILM COATED ORAL 2 TIMES DAILY
Qty: 14 TABLET | Refills: 0 | Status: SHIPPED | OUTPATIENT
Start: 2023-11-01 | End: 2023-11-08

## 2023-11-01 ASSESSMENT — ENCOUNTER SYMPTOMS
FOREIGN BODY SENSATION: 0
SINUS PRESSURE: 1
DOUBLE VISION: 0
SINUS PAIN: 1
HOARSE VOICE: 0
SHORTNESS OF BREATH: 0
COUGH: 1
ALLERGIC/IMMUNOLOGIC NEGATIVE: 1
VOMITING: 0
EYE DISCHARGE: 1
EYE ITCHING: 0
NAUSEA: 0
EYE PAIN: 0
CHEST TIGHTNESS: 0
SWOLLEN GLANDS: 0
RHINORRHEA: 0
WHEEZING: 0
BLURRED VISION: 0
SORE THROAT: 0
EYE REDNESS: 1
PHOTOPHOBIA: 0
FACIAL SWELLING: 1

## 2023-11-01 ASSESSMENT — VISUAL ACUITY: OU: 1

## 2023-11-01 NOTE — PROGRESS NOTES
PROGRESS NOTE    SUBJECTIVE:     Spencer Dominguez is a 80 y.o. female seen for:    Chief Complaint    Sinusitis; Stye       Eye Problem   The right eye is affected. This is a new problem. The current episode started 1 to 4 weeks ago (2 weeks). The problem has been unchanged. There was no injury mechanism. There is No known exposure to pink eye. She Does not wear contacts. Associated symptoms include an eye discharge, eye redness and a recent URI. Pertinent negatives include no blurred vision, double vision, fever, foreign body sensation, itching, nausea, photophobia or vomiting. She has tried eye drops (Warm compresses) for the symptoms. The treatment provided mild relief. Sinusitis  This is a new problem. The current episode started 1 to 4 weeks ago (2 weeks). The problem is unchanged. There has been no fever. Associated symptoms include congestion, coughing, headaches and sinus pressure. Pertinent negatives include no chills, diaphoresis, ear pain, hoarse voice, neck pain, shortness of breath, sneezing, sore throat or swollen glands. Past treatments include nothing. Patient complains of a stye on her right upper eyelid x 2 weeks. Associated symptoms include periorbital swelling only yesterday, but this has resolved today. Also, there is drainage in her right eye that is worse in the morning and it's yellow in color. She has experienced nasal congestion, sinus pain and pressure, and a dry cough for 2 weeks as well. Patient believes she may have a sinus infection.      Current Outpatient Medications   Medication Sig Dispense Refill    amoxicillin-clavulanate (AUGMENTIN) 875-125 MG per tablet Take 1 tablet by mouth 2 times daily for 7 days 14 tablet 0    hydroCHLOROthiazide (HYDRODIURIL) 25 MG tablet Take 1 tablet by mouth daily 90 tablet 0    pioglitazone (ACTOS) 15 MG tablet Take 1 tablet by mouth daily 90 tablet 0    fexofenadine (ALLEGRA) 180 MG tablet Take 1 tablet by mouth daily      aspirin 81 MG EC tablet

## 2023-11-03 ENCOUNTER — OFFICE VISIT (OUTPATIENT)
Dept: OCCUPATIONAL MEDICINE | Age: 68
End: 2023-11-03

## 2023-11-03 DIAGNOSIS — H10.9 CONJUNCTIVITIS OF RIGHT EYE, UNSPECIFIED CONJUNCTIVITIS TYPE: Primary | ICD-10-CM

## 2023-11-03 DIAGNOSIS — H00.011 HORDEOLUM EXTERNUM OF RIGHT UPPER EYELID: ICD-10-CM

## 2023-11-03 DIAGNOSIS — J01.00 ACUTE NON-RECURRENT MAXILLARY SINUSITIS: ICD-10-CM

## 2023-11-03 RX ORDER — CIPROFLOXACIN HYDROCHLORIDE 3.5 MG/ML
SOLUTION/ DROPS TOPICAL
Qty: 5 ML | Refills: 0 | Status: SHIPPED | OUTPATIENT
Start: 2023-11-03

## 2023-11-03 ASSESSMENT — ENCOUNTER SYMPTOMS
EYE DISCHARGE: 1
GASTROINTESTINAL NEGATIVE: 1
ALLERGIC/IMMUNOLOGIC NEGATIVE: 1
SINUS PAIN: 1
EYE ITCHING: 0
PHOTOPHOBIA: 0
RESPIRATORY NEGATIVE: 1
EYE REDNESS: 1
SINUS PRESSURE: 1
EYE PAIN: 0

## 2023-11-03 ASSESSMENT — VISUAL ACUITY: OU: 1

## 2023-11-03 NOTE — PROGRESS NOTES
PROGRESS NOTE    SUBJECTIVE:     Danay Bates is a 80 y.o. female seen for:    Chief Complaint    Follow-up       Patient here for 2 day follow up. Patient started the Augmentin for her sinus infection on Wednesday evening. She has only taken 3 pills so far. She has not noticed any improvement in her sinuses yet. Also, the stye on her right upper eyelid appears worse today. Patient called her eye doctor and the soonest appointment they had is next Friday. Patient plans to follow up with them. Current Outpatient Medications   Medication Sig Dispense Refill    ciprofloxacin (CILOXAN) 0.3 % ophthalmic solution Place 1 to 2 drops in right eye every 2 hours while awake for 2 days. Then, 4 times daily for 5 days. 5 mL 0    amoxicillin-clavulanate (AUGMENTIN) 875-125 MG per tablet Take 1 tablet by mouth 2 times daily for 7 days 14 tablet 0    hydroCHLOROthiazide (HYDRODIURIL) 25 MG tablet Take 1 tablet by mouth daily 90 tablet 0    pioglitazone (ACTOS) 15 MG tablet Take 1 tablet by mouth daily 90 tablet 0    fexofenadine (ALLEGRA) 180 MG tablet Take 1 tablet by mouth daily      aspirin 81 MG EC tablet Take 1 tablet by mouth daily      gabapentin (NEURONTIN) 300 MG capsule Take 2 capsules by mouth 3 times daily. No current facility-administered medications for this visit. Allergies   Allergen Reactions    Sulfa Antibiotics Other (See Comments)     Severe muscle aches      Clindamycin Itching and Rash    Lisinopril Cough     Social History     Tobacco Use    Smoking status: Every Day     Packs/day: .5     Types: Cigarettes    Smokeless tobacco: Never   Substance Use Topics    Alcohol use: No    Drug use: Never      Review of Systems   Constitutional: Negative. HENT:  Positive for congestion, sinus pressure and sinus pain. Eyes:  Positive for discharge and redness. Negative for photophobia, pain, itching and visual disturbance. Respiratory: Negative. Cardiovascular: Negative.     Gastrointestinal:

## 2024-05-17 ENCOUNTER — OFFICE VISIT (OUTPATIENT)
Age: 69
End: 2024-05-17

## 2024-05-17 VITALS
RESPIRATION RATE: 16 BRPM | DIASTOLIC BLOOD PRESSURE: 100 MMHG | SYSTOLIC BLOOD PRESSURE: 160 MMHG | HEART RATE: 82 BPM | OXYGEN SATURATION: 95 %

## 2024-05-17 DIAGNOSIS — I10 ESSENTIAL (PRIMARY) HYPERTENSION: ICD-10-CM

## 2024-05-17 DIAGNOSIS — Z76.0 MEDICATION REFILL: Primary | ICD-10-CM

## 2024-05-17 RX ORDER — HYDROCHLOROTHIAZIDE 25 MG/1
25 TABLET ORAL DAILY
Qty: 30 TABLET | Refills: 0 | Status: SHIPPED | OUTPATIENT
Start: 2024-05-17

## 2024-05-17 NOTE — PROGRESS NOTES
Psychiatric:         Mood and Affect: Mood normal.         Behavior: Behavior normal.       ASSESSMENT and PLAN    Catherine was seen today for medication refill.    Diagnoses and all orders for this visit:    Medication refill  -     hydroCHLOROthiazide (HYDRODIURIL) 25 MG tablet; Take 1 tablet by mouth daily    Essential (primary) hypertension  -     hydroCHLOROthiazide (HYDRODIURIL) 25 MG tablet; Take 1 tablet by mouth daily    Only gave patient 30 days worth of HCTZ. Follow up with Primary Care Provider for routine physical and to ask for more refills of HCTZ. Instructed patient to inform Primary Care Provider her blood pressure was elevated this visit. Reach out to clinic with any questions or concerns. To ER if your blood pressure is 180/120 mm Hg or greater and you have chest pain, shortness of breath, severe headache, numbness/tingling, trouble speaking, or changes in vision. Patient verbalized understanding and agreement.    Counseled on benefits of having a primary care provider which includes, but is not limited to, continuity of care and having a medical home when concerns arise. Also enforced that onsite clinic policy states that we are not to take the place of a primary care provider, pt verbalized understanding.     SEs and risk vs benefits associated with medications prescribed discussed with patient who verbalized understanding. Pt verbalized understanding and agreement with plan of care. RTC for persisting/worsening symptoms or new complaints that arise. Discussed signs and symptoms that would warrant immediate evaluation including, but not limited to HA, blurred vision, speech disturbance, difficulty with ambulation/gait, numbness, tingling, weakness, syncope, chest pain, or shortness of breath.    I have reviewed the patient's medication list, past medical, family, social, and surgical history in detail and updated the patient record appropriately.    Mickie Catalan, APRN - CNP

## 2025-06-25 ENCOUNTER — OFFICE VISIT (OUTPATIENT)
Age: 70
End: 2025-06-25

## 2025-06-25 VITALS
TEMPERATURE: 98.2 F | RESPIRATION RATE: 16 BRPM | SYSTOLIC BLOOD PRESSURE: 132 MMHG | HEART RATE: 71 BPM | OXYGEN SATURATION: 96 % | DIASTOLIC BLOOD PRESSURE: 78 MMHG

## 2025-06-25 DIAGNOSIS — L98.9 SKIN LESION: Primary | ICD-10-CM

## 2025-06-25 RX ORDER — TIRZEPATIDE 5 MG/.5ML
INJECTION, SOLUTION SUBCUTANEOUS
COMMUNITY

## 2025-06-25 RX ORDER — BLOOD SUGAR DIAGNOSTIC
STRIP MISCELLANEOUS
COMMUNITY
Start: 2024-10-10

## 2025-06-25 RX ORDER — ERGOCALCIFEROL (VITAMIN D2) 10 MCG
TABLET ORAL DAILY
COMMUNITY

## 2025-06-25 RX ORDER — BLOOD-GLUCOSE METER
KIT MISCELLANEOUS
COMMUNITY
Start: 2025-04-17

## 2025-06-25 ASSESSMENT — ENCOUNTER SYMPTOMS
NAUSEA: 0
CHEST TIGHTNESS: 0
SHORTNESS OF BREATH: 0
VOMITING: 0
ABDOMINAL PAIN: 0
COLOR CHANGE: 1
DIARRHEA: 0
WHEEZING: 0

## 2025-06-25 NOTE — PROGRESS NOTES
[facility] [employer]  ONSITE CLINIC      Date of Visit: 6/25/25     Lynne Dougherty is a 70 y.o. female presenting to the [facility]site [facility] at her place of employment, [employer]. she does have a Primary Care Provider that she sees regularly, No, Pcp.  Chief Complaint    Skin Problem       SHARON Darby reports she noticed a spot on her left lower abdomen x4 days ago. States she initially thought it was a mole, but now the area is looking bruised. Is worried it may be a brown recluse spider bite, although she did not witness an insect bite her. Denies any pain, itching, redness, swelling, warmth, or drainage from the site. Denies fevers, chills, night sweats, body aches, headaches, increased fatigue, new joint pain or swelling, neck pain or stiffness, chest pain, shortness of breath, abdominal, pain, nausea, vomiting, and diarrhea. States the spot's appearance has improved significantly over the past few days compared to when she first noticed it.    Past Medical History:   Diagnosis Date    H/O seasonal allergies     Heel spur, left     High blood pressure     Neuropathy     Type 2 diabetes mellitus (HCC)       Outpatient Medications Prior to Visit   Medication Sig Dispense Refill    blood glucose test strips (ONETOUCH VERIO) strip Check blood sugar twice daily      Blood Glucose Monitoring Suppl (ONETOUCH VERIO REFLECT) w/Device KIT CHECK BLOOD SUGAR TWICE DAILY      hydroCHLOROthiazide (HYDRODIURIL) 25 MG tablet Take 1 tablet by mouth daily 30 tablet 0    aspirin 81 MG EC tablet Take 1 tablet by mouth daily      gabapentin (NEURONTIN) 300 MG capsule Take 2 capsules by mouth 3 times daily.      MOUNJARO 5 MG/0.5ML SOAJ pen INJECT 5 MG UNDER THE SKIN ONCE A WEEK ON THE SAME DAY EVERY WEEK      cholecalciferol (VITAMIN D, CHOLECALCIFEROL,) 400 UNIT TABS tablet Take by mouth daily      cyanocobalamin 500 MCG tablet Take 1 tablet by mouth daily      pioglitazone (ACTOS) 15 MG tablet Take 1 tablet by mouth daily

## 2025-06-25 NOTE — PATIENT INSTRUCTIONS
Return to clinic if lesion does not resolved in 1-2 weeks or sooner if you develop spreading erythema, swelling, or red streaking, tenderness, warmth, or drainage from the sit, fevers, chills, body aches, night sweats, neck pain or stiffness.